# Patient Record
Sex: MALE | Race: AMERICAN INDIAN OR ALASKA NATIVE
[De-identification: names, ages, dates, MRNs, and addresses within clinical notes are randomized per-mention and may not be internally consistent; named-entity substitution may affect disease eponyms.]

---

## 2019-02-26 ENCOUNTER — HOSPITAL ENCOUNTER (EMERGENCY)
Dept: HOSPITAL 42 - ED | Age: 59
Discharge: HOME | End: 2019-02-26
Payer: COMMERCIAL

## 2019-02-26 VITALS — SYSTOLIC BLOOD PRESSURE: 135 MMHG | HEART RATE: 88 BPM | DIASTOLIC BLOOD PRESSURE: 89 MMHG | OXYGEN SATURATION: 99 %

## 2019-02-26 VITALS — RESPIRATION RATE: 18 BRPM | TEMPERATURE: 98.2 F

## 2019-02-26 DIAGNOSIS — S01.112A: Primary | ICD-10-CM

## 2019-02-26 DIAGNOSIS — E78.5: ICD-10-CM

## 2019-02-26 DIAGNOSIS — Z23: ICD-10-CM

## 2019-02-26 DIAGNOSIS — I10: ICD-10-CM
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
## 2019-02-26 DIAGNOSIS — M43.10: ICD-10-CM

## 2019-02-26 DIAGNOSIS — K42.9: ICD-10-CM

## 2019-02-26 DIAGNOSIS — M27.40: ICD-10-CM

## 2019-02-26 LAB
ALBUMIN SERPL-MCNC: 4.7 G/DL (ref 3–4.8)
ALBUMIN/GLOB SERPL: 1.3 {RATIO} (ref 1.1–1.8)
ALT SERPL-CCNC: 32 U/L (ref 7–56)
APTT BLD: 33.5 SECONDS (ref 26.9–38.3)
AST SERPL-CCNC: 45 U/L (ref 17–59)
BASOPHILS # BLD AUTO: 0.02 K/MM3 (ref 0–2)
BASOPHILS NFR BLD: 0.2 % (ref 0–3)
BUN SERPL-MCNC: 9 MG/DL (ref 7–21)
CALCIUM SERPL-MCNC: 9.8 MG/DL (ref 8.4–10.5)
EOSINOPHIL # BLD: 0.1 10*3/UL (ref 0–0.7)
EOSINOPHIL NFR BLD: 0.8 % (ref 1.5–5)
EOSINOPHIL NFR BLD: 2 % (ref 0–3)
ERYTHROCYTE [DISTWIDTH] IN BLOOD BY AUTOMATED COUNT: 13.5 % (ref 11.5–14.5)
GFR NON-AFRICAN AMERICAN: > 60
HGB BLD-MCNC: 14.8 G/DL (ref 14–18)
HYPOCHROMIA BLD QL SMEAR: (no result)
INR PPP: 0.96
LYMPHOCYTE: 64 % (ref 22–35)
LYMPHOCYTES # BLD: 6.3 10*3/UL (ref 1.2–3.4)
LYMPHOCYTES NFR BLD AUTO: 72.9 % (ref 22–35)
MCH RBC QN AUTO: 31.3 PG (ref 25–35)
MCHC RBC AUTO-ENTMCNC: 33.8 G/DL (ref 31–37)
MCV RBC AUTO: 92.6 FL (ref 80–105)
MONOCYTE: 8 % (ref 1–6)
MONOCYTES # BLD AUTO: 0.7 10*3/UL (ref 0.1–0.6)
MONOCYTES NFR BLD: 7.6 % (ref 1–6)
NEUTROPHILS NFR BLD AUTO: 26 % (ref 50–70)
NEUTS BAND NFR BLD: 0 % (ref 0–2)
PLATELET # BLD EST: NORMAL 10*3/UL
PLATELET # BLD: 249 10^3/UL (ref 120–450)
PMV BLD AUTO: 9.7 FL (ref 7–11)
PROTHROMBIN TIME: 10.7 SECONDS (ref 9.4–12.5)
RBC # BLD AUTO: 4.73 10^6/UL (ref 3.5–6.1)
ROULEAU: (no result)
TOXIC GRANULES BLD QL SMEAR: (no result)
VARIANT LYMPHS NFR BLD MANUAL: 0 % (ref 0–0)
WBC # BLD AUTO: 8.7 10^3/UL (ref 4.5–11)

## 2019-02-26 PROCEDURE — 90715 TDAP VACCINE 7 YRS/> IM: CPT

## 2019-02-26 PROCEDURE — 86850 RBC ANTIBODY SCREEN: CPT

## 2019-02-26 PROCEDURE — 99285 EMERGENCY DEPT VISIT HI MDM: CPT

## 2019-02-26 PROCEDURE — 85730 THROMBOPLASTIN TIME PARTIAL: CPT

## 2019-02-26 PROCEDURE — 70450 CT HEAD/BRAIN W/O DYE: CPT

## 2019-02-26 PROCEDURE — 85610 PROTHROMBIN TIME: CPT

## 2019-02-26 PROCEDURE — 71046 X-RAY EXAM CHEST 2 VIEWS: CPT

## 2019-02-26 PROCEDURE — 73130 X-RAY EXAM OF HAND: CPT

## 2019-02-26 PROCEDURE — 93005 ELECTROCARDIOGRAM TRACING: CPT

## 2019-02-26 PROCEDURE — 12013 RPR F/E/E/N/L/M 2.6-5.0 CM: CPT

## 2019-02-26 PROCEDURE — 74177 CT ABD & PELVIS W/CONTRAST: CPT

## 2019-02-26 PROCEDURE — 86900 BLOOD TYPING SEROLOGIC ABO: CPT

## 2019-02-26 PROCEDURE — 80053 COMPREHEN METABOLIC PANEL: CPT

## 2019-02-26 PROCEDURE — 85025 COMPLETE CBC W/AUTO DIFF WBC: CPT

## 2019-02-26 PROCEDURE — 72125 CT NECK SPINE W/O DYE: CPT

## 2019-02-26 PROCEDURE — 90471 IMMUNIZATION ADMIN: CPT
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
## 2019-02-27 NOTE — RAD
PROCEDURE:  Left Hand Radiographs.



HISTORY:

 fall 



COMPARISON:

None.



FINDINGS:



BONES:

No acute fracture or destructive bony lesion identified.



JOINTS:

Extensive osteoarthritis is appreciated at the interphalangeal joint 

of the thumb as well as the distal interphalangeal joint of the small 

finger and long finger.  No dislocation or subluxation. 



SOFT TISSUES:

Normal. 



OTHER FINDINGS:

None.



IMPRESSION:

Osteoarthritis at the thumb long and small fingers left hand.  No 

acute fracture or dislocation identified.

## 2019-02-27 NOTE — CT
Date of service: 



02/26/2019



PROCEDURE:  CT Abdomen and Pelvis with contrast



HISTORY:

fall



COMPARISON:

None.



TECHNIQUE:

Contrast dose: 100 cc of Omni 350



Radiation dose:



Total exam DLP = 252.46 mGy-cm.



This CT exam was performed using one or more of the following dose 

reduction techniques: Automated exposure control, adjustment of the 

mA and/or kV according to patient size, and/or use of iterative 

reconstruction technique.



FINDINGS:



LOWER THORAX:

Unremarkable. 



LIVER:

Unremarkable. No gross lesion or ductal dilatation. 



GALLBLADDER AND BILE DUCTS:

Unremarkable. 



PANCREAS:

Unremarkable. No gross lesion or ductal dilatation.



SPLEEN:

Unremarkable. 



ADRENALS:

Unremarkable. No mass. 



KIDNEYS AND URETERS:

Unremarkable. No hydronephrosis. No solid mass. 



VASCULATURE:

Unremarkable. No aortic aneurysm. No aortic atherosclerotic 

calcification or mural plaque present.



BOWEL:

Unremarkable. No obstruction. No gross mural thickening. 



APPENDIX:

Normal appendix. 



PERITONEUM:

Unremarkable. No free fluid. No free air.  Small fat containing 

umbilical hernia 



LYMPH NODES:

Unremarkable. No enlarged lymph nodes. 



BLADDER:

There is mild mural thickening of the bladder, possible cystitis 



REPRODUCTIVE:

Unremarkable. 



BONES:

No acute fracture. 



OTHER FINDINGS:

The report concurs with the preliminary [facility] report



IMPRESSION:

No evidence of fracture or intra-abdominal hemorrhage.

## 2019-02-27 NOTE — CT
Date of service: 



02/26/2019



PROCEDURE:  CT HEAD WITHOUT CONTRAST.



HISTORY:

fall on down stairs



COMPARISON:

None available.



TECHNIQUE:

Axial computed tomography images were obtained through the head/brain 

without intravenous contrast.  



Radiation dose:



Total exam DLP = 1005.24 mGy-cm.



This CT exam was performed using one or more of the following dose 

reduction techniques: Automated exposure control, adjustment of the 

mA and/or kV according to patient size, and/or use of iterative 

reconstruction technique.



FINDINGS:



HEMORRHAGE:

No intracranial hemorrhage. 



BRAIN:

No mass effect or edema.  No atrophy or chronic microvascular 

ischemic changes.



VENTRICLES:

Unremarkable. No hydrocephalus. 



CALVARIUM:

Unremarkable.



PARANASAL SINUSES:

There is a dentigerous cyst extending into the right maxillary sinus 

which contains a tooth.  There is a thin walled bony rim.  This 

measures 19 x 34 mm.



MASTOID AIR CELLS:

Unremarkable as visualized. No inflammatory changes.



OTHER FINDINGS:

The report concurs with the preliminary [facility] report



IMPRESSION:

No acute intracranial findings

## 2019-02-27 NOTE — RAD
PROCEDURE:  Right Hand Radiographs.



HISTORY:

fall



COMPARISON:

None.



FINDINGS:



BONES:

No acute fracture or destructive bony lesion identified.



JOINTS:

Joint space narrowing and osteophyte development are seen at the 

interphalangeal joint of the thumb as well as the distal 

interphalangeal joint of the small finger. 



SOFT TISSUES:

Normal. 



OTHER FINDINGS:

None.



IMPRESSION:

Degenerative changes seen the thumb and small finger as discussed 

above without fracture dislocation otherwise evident throughout the 

remainder of the right hand.

## 2019-02-27 NOTE — CT
Date of service: 



02/26/2019



PROCEDURE:  CT Cervical Spine without contrast



HISTORY:

FALL



COMPARISON:

None available.



TECHNIQUE:

Axial computed tomography images were obtained of the cervical spine 

without the use of intravenous contrast. Coronal and sagittal 

reformatted images were created and reviewed.



Radiation dose:



Total exam DLP = 427.64 mGy-cm.



This CT exam was performed using one or more of the following dose 

reduction techniques: Automated exposure control, adjustment of the 

mA and/or kV according to patient size, and/or use of iterative 

reconstruction technique.



FINDINGS:



VERTEBRAE:

No fracture. Normal alignment. No destructive bony lesion.



DISCS/SPINAL CANAL/NEURAL FORAMINA:

Multilevel disc degeneration is seen.  There are large anterior 

osteophytes.  Multilevel foraminal stenosis. 



PARASPINAL SOFT TISSUES:

Unremarkable. 



OTHER FINDINGS:

The report concurs with the preliminary [facility] report



IMPRESSION:

Multilevel disc degeneration is seen.  There are large anterior 

osteophytes.  Multilevel foraminal stenosis.



No acute findings

## 2019-02-27 NOTE — CARD
--------------- APPROVED REPORT --------------





Date of service: 02/26/2019



EKG Measurement

Heart [id number]

VA P75

[id number]

[id number]

[id number]



<Conclusion>

Probable Sinus Rythm.

Somatic Tremors.

## 2019-02-27 NOTE — RAD
Date of service: 



02/26/2019



HISTORY:

 fall 



COMPARISON:

No prior.



TECHNIQUE:

Chest PA and lateral



FINDINGS:



LUNGS:

No active pulmonary disease.



PLEURA:

No significant pleural effusion identified. No pneumothorax apparent.



CARDIOVASCULAR:

No aortic atherosclerotic calcification present.



Normal cardiac size. No pulmonary vascular congestion. 



OSSEOUS STRUCTURES:

No significant abnormalities.



VISUALIZED UPPER ABDOMEN:

Normal.



OTHER FINDINGS:

None.



IMPRESSION:

No acute cardiopulmonary disease appreciated.